# Patient Record
(demographics unavailable — no encounter records)

---

## 2025-03-15 NOTE — HEALTH RISK ASSESSMENT
[No] : In the past 12 months have you used drugs other than those required for medical reasons? No [No falls in past year] : Patient reported no falls in the past year [0] : 2) Feeling down, depressed, or hopeless: Not at all (0) [Never] : Never [de-identified] : None [[id number]] : 0

## 2025-03-15 NOTE — HISTORY OF PRESENT ILLNESS
[de-identified] : 53 year old  female patient with history of stable Essential Hypertension, Type 2 Diabetes Mellitus with hyperglycemia, Vitamin B12 Deficiency, Vitamin D Deficiency, history as stated, presented for follow up examination. Patient is compliant with all medications. ROS as stated.

## 2025-03-15 NOTE — ASSESSMENT
[FreeTextEntry1] : 53 year old female found to have stable Essential Hypertension, Type 2 Diabetes Mellitus with hyperglycemia, Vitamin B12 Deficiency, Vitamin D Deficiency, with the current prescription regimen as recommended, diet and lifestyle modifications, as counseled. Prior results reviewed, interpreted and discussed with the patient during today's examination, as appropriate. Follow up, treatment plan and tests, as ordered.  Patient was recommended to follow up with GYN for routine examination, PAP smear and Mammogram, reports will be provided, when ready.   EKG: Sinus Rhythm @ 63 BPM. Known LAD, no new acute ST-T changes noted.  Total time spent:: 25 minutes Including: Preparation prior to visit - Reviewing prior record, results of tests and Consultation Reports as applicable Conducting an appropriate H & P during today's encounter Appropriate orders for tests, medications and procedures, as applicable Counseling patient Note completion

## 2025-04-24 NOTE — PLAN
[FreeTextEntry1] : Normal annual encounter for a well woman exam Patient is overweight and regular vigorous exercise is encouraged for this as well as an approach towards managing stress and energy levels A Pap smear is performed Patient had a recent normal mammogram Colonoscopy was done in 2023 and she is on a 10-year schedule And schedule 1 year follow-up has been recommended

## 2025-04-24 NOTE — PHYSICAL EXAM
[Chaperoned Physical Exam] : A chaperone was present in the examining room during all aspects of the physical examination. [MA] : MA [FreeTextEntry2] : Trista Cleevland [Appropriately responsive] : appropriately responsive [Alert] : alert [No Acute Distress] : no acute distress [No Lymphadenopathy] : no lymphadenopathy [Soft] : soft [Non-tender] : non-tender [Non-distended] : non-distended [No HSM] : No HSM [No Lesions] : no lesions [No Mass] : no mass [Oriented x3] : oriented x3 [FreeTextEntry6] : No masses no axillary adenopathy and no nipple discharge [FreeTextEntry7] : Obese [Labia Majora] : normal [Labia Minora] : normal [Normal] : normal [Uterine Adnexae] : non-palpable [FreeTextEntry9] : Rectal is normal and guaiac is negative

## 2025-04-24 NOTE — HISTORY OF PRESENT ILLNESS
[FreeTextEntry1] : Patient presents for her annual examination She has no complaints She is a para 2 with 1  section Surgical history includes carpal tunnel and various finger surgeries Current medications are losartan metformin and glipizide No medication allergies Family history significant for 2 maternal aunts with history of  post menopausal breast cancer She had a recent normal mammogram She has been given a referral previously for genetic testing Patient's depression screening is reviewed with her.  She finds work stressful particularly during tax season.  This does create some anxiety and diminished libido is also a concern that we discussed.